# Patient Record
Sex: MALE
[De-identification: names, ages, dates, MRNs, and addresses within clinical notes are randomized per-mention and may not be internally consistent; named-entity substitution may affect disease eponyms.]

---

## 2020-11-05 PROBLEM — Z00.00 ENCOUNTER FOR PREVENTIVE HEALTH EXAMINATION: Status: ACTIVE | Noted: 2020-11-05

## 2021-01-05 ENCOUNTER — APPOINTMENT (OUTPATIENT)
Dept: UROLOGY | Facility: CLINIC | Age: 36
End: 2021-01-05
Payer: COMMERCIAL

## 2021-01-05 VITALS — BODY MASS INDEX: 33.27 KG/M2 | WEIGHT: 212 LBS | TEMPERATURE: 97.5 F | HEIGHT: 67 IN

## 2021-01-05 VITALS — DIASTOLIC BLOOD PRESSURE: 68 MMHG | HEART RATE: 75 BPM | SYSTOLIC BLOOD PRESSURE: 132 MMHG

## 2021-01-05 DIAGNOSIS — Z72.0 TOBACCO USE: ICD-10-CM

## 2021-01-05 DIAGNOSIS — Z78.9 OTHER SPECIFIED HEALTH STATUS: ICD-10-CM

## 2021-01-05 DIAGNOSIS — Z80.3 FAMILY HISTORY OF MALIGNANT NEOPLASM OF BREAST: ICD-10-CM

## 2021-01-05 PROCEDURE — 99203 OFFICE O/P NEW LOW 30 MIN: CPT

## 2021-01-05 PROCEDURE — 99072 ADDL SUPL MATRL&STAF TM PHE: CPT

## 2021-01-05 NOTE — LETTER BODY
[Dear  ___] : Dear  [unfilled], [Please see my note below.] : Please see my note below. [Consult Closing:] : Thank you very much for allowing me to participate in the care of this patient.  If you have any questions, please do not hesitate to contact me. [Sincerely,] : Sincerely, [FreeTextEntry2] : Pablito Doran MD\par [address]\par [place] [address]\par

## 2021-01-05 NOTE — PHYSICAL EXAM
[General Appearance - Well Developed] : well developed [General Appearance - Well Nourished] : well nourished [Normal Appearance] : normal appearance [Well Groomed] : well groomed [General Appearance - In No Acute Distress] : no acute distress [Heart Rate And Rhythm] : Heart rate and rhythm were normal [Respiration, Rhythm And Depth] : normal respiratory rhythm and effort [Exaggerated Use Of Accessory Muscles For Inspiration] : no accessory muscle use [Auscultation Breath Sounds / Voice Sounds] : lungs were clear to auscultation bilaterally [Abdomen Soft] : soft [Abdomen Tenderness] : non-tender [Abdomen Hernia] : no hernia was discovered [Costovertebral Angle Tenderness] : no ~M costovertebral angle tenderness [Urethral Meatus] : meatus normal [Penis Abnormality] : normal circumcised penis [Testes Tenderness] : no tenderness of the testes [Testes Mass (___cm)] : there were no testicular masses [Normal Station and Gait] : the gait and station were normal for the patient's age [] : no rash [No Focal Deficits] : no focal deficits [Oriented To Time, Place, And Person] : oriented to person, place, and time [Affect] : the affect was normal [Mood] : the mood was normal [Not Anxious] : not anxious [Inguinal Lymph Nodes Enlarged Bilaterally] : inguinal [FreeTextEntry1] : dtr normal

## 2021-01-05 NOTE — ASSESSMENT
[FreeTextEntry1] : He has early ejaculation with trouble getting to a second round during that timeframe with which he is awakened still entered sexual activity. We discussed several options. Number one sex therapy be it self-help or with the professional has a high success rate it done properly. Number two, he can try promescent, a topical agent, though he was cautioned to wipe it off before he goes inside his wife. He can use Viagra to make it easy to get the first direction and shorten the climacteric and or we can use Paxil at a low dose and explain how that can cause delay in ejaculation. He understands the use of both of these for this issue is off label. Theoretically he can go on a self injection program giving him an iatrogenic priapism but the risks of that is he got last two long and he asked using needle and he can always uses stay erec ring, however that interferes with ejaculation and can be uncomfortable.\par \par We left it that we will get hormones just to make sure there’s no issue of hypogonadism, he will try the spray and trying get some books online to do self-help. We will TeleMed in a few weeks to review his response. If this is not to his satisfaction he will have to decide if he wants to try Viagra and or Paxil\par

## 2021-01-05 NOTE — HISTORY OF PRESENT ILLNESS
[Currently Experiencing ___] :  [unfilled] [FreeTextEntry1] : Jed is a 35-year-old Evangelical Yaz[employer] male  to his wife in a good relationship for about 12 years. They have two children ages nine and seven in about a year after their second daughter was born the white developed leukemia ended up with the bone marrow transplant and at this point no longer gets her. Sending out questioning children. He is here more because pretty much there whole relationship he would ejaculate quite quickly. He can get fairly rigid though he says sometimes it’s not as hard as others he can that she penetration. There is sometimes where he will pop almost immediately after penetration sometimes he will last 30 seconds on rare occasions a few minutes. On the extremely rare occasion that they have a second round the same night he may last five minutes. The issue is not his wife is that he is working for different jobs it takes him an hour if not more to recycle and by that he’s pretty much gone. If they have relations for example Saturday afternoon and then try again Saturday night by that time he’s back to early ejaculation. During foreplay he has no trouble holding back ejaculation it’s only once he achieves penetration. He doesn’t feel there’s any problem from his wife’s you point you think is a very passionate woman and she has a strong libido. He personally has no past medical surgical issues is never seen the therapist feels that other than the stress of working for jobs life is good he’ll have an occasional bottle of beer and wine on weekends. About three months ago he started vaping some cannabis and found that he could hold perhaps one or two minutes. He says the wood is still better than it was is not nearly as it was when he first started vaping we did discuss how that can damage her health and is probably against his Confucianism beliefs. He denies any and all other urologic issues.\par \par Please note, as we were leaving he brought up by the way is there anything you can do about the size of my penis as he feels his penis is small. His wife is never complained never brought up the thought that if he only had a larger penis things would be better I think this is all self-induced\par

## 2021-01-05 NOTE — LETTER HEADER
[FreeTextEntry3] : Farzad Jones M.D.\par Director of Urology\par [facility]/Job\par [address] [place] [address], [address]\[place] [address]

## 2021-02-11 ENCOUNTER — APPOINTMENT (OUTPATIENT)
Dept: UROLOGY | Facility: CLINIC | Age: 36
End: 2021-02-11
Payer: COMMERCIAL

## 2021-02-11 DIAGNOSIS — F52.4 PREMATURE EJACULATION: ICD-10-CM

## 2021-02-11 PROCEDURE — 99214 OFFICE O/P EST MOD 30 MIN: CPT | Mod: 95

## 2021-02-11 NOTE — ASSESSMENT
[FreeTextEntry1] : The hormones are normal and I asked him what he tried and how it/they worked. He had a lot of evasive”. Answers but the net result was he still doing “research on [employer] he has some books in his hold list these were did not think he did not get the spray. He could not answer as to why but as I explained to him this is not uncommon. Men like to think that they can easily satisfy the partners and right now he thinks he is not, his wife is not complained but he thinks that there are some things that might help. As long as he hasn’t tried them and failed it is not a dead issue. If he tries the minute fails then what is he do.\par \par I told him at worst if he tries and fails will be no worse off than he is now and since his wife is not complaining he should not  himself that harshly and enjoy like with a good woman. If he wants to try and once again the order will be\par \par Self-help books such as those he already has set aside on [employer]\par such as with the sex therapist not a therapist to will do sex therapy but someone a member of the Society of sex therapist is the what is expensive to the success rate is extremely high\par The over-the-counter spray Promescent , making sure to wipe it off before he cheese vaginal penetration\par off label use of Paxil which is an antidepressant which at low doses interferes with the ability to reach orgasm so when people who are having rapid orgasm it helps them, and again that some but not all, delay orgasm.\par Off label use of Viagra which enable him to get the first erection more rigid more easily with less stimulation so he might not be as close to the climacteric as he is now. As well he may get a second erection sooner where he is still desensitized\par the ring that goes at the base of the penis and holds a compressed so that even after ejaculation the blood stays within the penis and he could keep going. This is not universally like his number one it become somewhat uncomfortable to stimulate the penis after orgasm until it has reset number two it interferes with ejaculation which if it’s a high volume causes distention of the prostatic urethra and discomfort\par a self injection program to cause a deliberate short lived priapism so that even after ejaculation his penis will stay rigid. The problem with that as it needs a needle into the penis and if he gets himself too much even after his wife is done his penis will go down and it become painful.\par \par I am not giving him had other appointment at this point he will look into what he wants if what he does works well great if he decides he wants Viagra he can get it from his primary care doctor if he wants to go to the other steps will probably have to call and make another appointment\par

## 2021-02-11 NOTE — HISTORY OF PRESENT ILLNESS
[Home] : at home, [unfilled] , at the time of the visit. [Medical Office: ([facility])___] : at the medical office located in  [Currently Experiencing ___] :  [unfilled] [FreeTextEntry3] : he has received, reviewed and agreed to the telemedicine consent  [FreeTextEntry1] : I communicated with him by his cell phone at [phone] and any email communications will be sent to [email or web address]\par \par Mr. Gunter is a 35-year-old Zoroastrianism Alevism male who is been  in a good relationship for about 12 years. He was seen in January 5, 2021 complaining of severe premature ejaculation. He can hold his ejaculation during foreplay but as soon as he penetrates he ejaculates. Please note as we were leaving he noted that his penis is small. His wife never complained about it. It is just that he felt if he had a larger penis his sex life would be better.\par The exam was within normal limits and I showed him that his penis size was completely normal\par \par I felt that he has trouble getting to a second round within the time frame that the climacteric was dulled. He told me that even if the last time he had sex was within 12 hours he still ejaculate it to rapidly. We discussed sex therapy but it is difficult to get and it is expensive. We discussed Promescent , a topical agent and we discussed Viagra which would make it easier to get the first erection as well as shorten the time to the second. We also discussed Paxil at a low dose and how that could lead to delayed ejaculation though it is in off label use. We briefly discussed a self injection program causing an iatrogenic priapism but that has its own risks and he didn’t want to use a needle. Finally we discussed a stay erec ring which holds the penis turgid but it interferes with ejaculation and can be uncomfortable.\par \par We left it that we will get hormones to make sure there’s no issue of hypogonadism and in the meantime he would try the Promescent spray and get some books online for self help. We are meeting today to go over the hormone values and see how the Promescent worked.\par \par \par \par \par [phone]\par \par labs are in chart

## 2021-02-11 NOTE — LETTER BODY
[Dear  ___] : Dear  [unfilled], [Courtesy Letter:] : I had the pleasure of seeing your patient, [unfilled], in my office today. [Please see my note below.] : Please see my note below. [Consult Closing:] : Thank you very much for allowing me to participate in the care of this patient.  If you have any questions, please do not hesitate to contact me. [Sincerely,] : Sincerely, [FreeTextEntry2] : Pablito Doran MD\par [address]\par [place] [address]\par

## 2024-12-22 NOTE — LETTER HEADER
[FreeTextEntry3] : Farzad Jones M.D.\par Director of Urology\par [facility]/Job\par [address] [place] [address], [address]\[place] [address]  No